# Patient Record
Sex: MALE
[De-identification: names, ages, dates, MRNs, and addresses within clinical notes are randomized per-mention and may not be internally consistent; named-entity substitution may affect disease eponyms.]

---

## 2019-03-13 ENCOUNTER — HOSPITAL ENCOUNTER (EMERGENCY)
Dept: HOSPITAL 46 - ED | Age: 19
LOS: 1 days | Discharge: HOME | End: 2019-03-14
Payer: SELF-PAY

## 2019-03-13 VITALS — WEIGHT: 198 LBS | BODY MASS INDEX: 27.72 KG/M2 | HEIGHT: 71 IN

## 2019-03-13 DIAGNOSIS — R07.9: Primary | ICD-10-CM

## 2019-08-21 NOTE — XMS
PreManage Notification: ADELE HERNANDEZ MRN:[id number]
 
Security Information
 
Security Events
No recent Security Events currently on file
 
 
 
CRITERIA MET
------------
- [facility] - 2 Visits in 30 Days
 
 
CARE PROVIDERS
-------------------------------------------------------------------------------------
PRIMITIVO WOO     Primary Care     Current
 
PHONE: [phone]
-------------------------------------------------------------------------------------
 
[facility] has no Care Guidelines for this patient.
 
E.D. VISIT COUNT (12 MO.)
-------------------------------------------------------------------------------------
2 [address]
-------------------------------------------------------------------------------------
TOTAL 4
-------------------------------------------------------------------------------------
NOTE: Visits indicate total known visits.
 
ED/C VISIT TRACKING (12 MO.)
-------------------------------------------------------------------------------------
08/21/2019 21:22
[facility]
 
TYPE: Emergency
 
COMPLAINT:
- HEAD COLD SYMPTOMS
-------------------------------------------------------------------------------------
08/10/2019 00:22
[facility]
 
TYPE: Emergency
 
DIAGNOSES:
- Other specified disorders of Eustachian tube, bilateral
- Tinnitus, left ear
- Labyrinthitis, bilateral
- Otalgia
-------------------------------------------------------------------------------------
07/09/2019 06:04
[facility]
 
TYPE: Emergency
 
 
DIAGNOSES:
- Other specified disorders of Eustachian tube, unspecified ear
- Otalgia, left ear
- Otalgia
- Otalgia, left ear
-------------------------------------------------------------------------------------
03/13/2019 21:39
[facility] [place]
 
TYPE: Emergency
 
COMPLAINT:
- CHEST PAIN
 
DIAGNOSES:
- Chest pain, unspecified
-------------------------------------------------------------------------------------
 
 
INPATIENT VISIT TRACKING (12 MO.)
No inpatient visits to display in this time frame
 
[email or web address]